# Patient Record
Sex: MALE | Race: WHITE | Employment: UNEMPLOYED | URBAN - METROPOLITAN AREA
[De-identification: names, ages, dates, MRNs, and addresses within clinical notes are randomized per-mention and may not be internally consistent; named-entity substitution may affect disease eponyms.]

---
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
## 2025-07-26 ENCOUNTER — APPOINTMENT (OUTPATIENT)
Dept: GENERAL RADIOLOGY | Facility: CLINIC | Age: 20
End: 2025-07-26
Attending: EMERGENCY MEDICINE
Payer: COMMERCIAL

## 2025-07-26 ENCOUNTER — HOSPITAL ENCOUNTER (EMERGENCY)
Facility: CLINIC | Age: 20
Discharge: HOME | End: 2025-07-26
Attending: EMERGENCY MEDICINE | Admitting: EMERGENCY MEDICINE
Payer: COMMERCIAL

## 2025-07-26 VITALS
RESPIRATION RATE: 18 BRPM | BODY MASS INDEX: 28.79 KG/M2 | TEMPERATURE: 97.4 F | SYSTOLIC BLOOD PRESSURE: 130 MMHG | OXYGEN SATURATION: 100 % | WEIGHT: 190 LBS | HEART RATE: 98 BPM | DIASTOLIC BLOOD PRESSURE: 88 MMHG | HEIGHT: 68 IN

## 2025-07-26 DIAGNOSIS — M54.41 ACUTE BILATERAL LOW BACK PAIN WITH RIGHT-SIDED SCIATICA: Primary | ICD-10-CM

## 2025-07-26 PROCEDURE — 72100 X-RAY EXAM L-S SPINE 2/3 VWS: CPT

## 2025-07-26 PROCEDURE — 99284 EMERGENCY DEPT VISIT MOD MDM: CPT | Performed by: EMERGENCY MEDICINE

## 2025-07-26 RX ORDER — HYDROCODONE BITARTRATE AND ACETAMINOPHEN 5; 325 MG/1; MG/1
1 TABLET ORAL EVERY 6 HOURS PRN
Qty: 10 TABLET | Refills: 0 | Status: SHIPPED | OUTPATIENT
Start: 2025-07-26 | End: 2025-07-29

## 2025-07-26 RX ORDER — CYCLOBENZAPRINE HCL 10 MG
5 TABLET ORAL 3 TIMES DAILY PRN
Qty: 11 TABLET | Refills: 0 | Status: SHIPPED | OUTPATIENT
Start: 2025-07-26 | End: 2025-08-02

## 2025-07-26 RX ORDER — METHYLPREDNISOLONE 4 MG/1
TABLET ORAL
Qty: 21 TABLET | Refills: 0 | Status: SHIPPED | OUTPATIENT
Start: 2025-07-26

## 2025-07-26 ASSESSMENT — COLUMBIA-SUICIDE SEVERITY RATING SCALE - C-SSRS
2. HAVE YOU ACTUALLY HAD ANY THOUGHTS OF KILLING YOURSELF IN THE PAST MONTH?: NO
1. IN THE PAST MONTH, HAVE YOU WISHED YOU WERE DEAD OR WISHED YOU COULD GO TO SLEEP AND NOT WAKE UP?: NO
6. HAVE YOU EVER DONE ANYTHING, STARTED TO DO ANYTHING, OR PREPARED TO DO ANYTHING TO END YOUR LIFE?: NO

## 2025-07-26 ASSESSMENT — ACTIVITIES OF DAILY LIVING (ADL): ADLS_ACUITY_SCORE: 41

## 2025-07-26 NOTE — LETTER
July 26, 2025      To Whom It May Concern:      Hiren Allen was seen in our Emergency Department today, 07/26/25.  I expect his condition to improve over the next 5 days.  He may return to work/school when improved.    Sincerely,        Sung Alexander MD  Electronically signed

## 2025-07-26 NOTE — ED PROVIDER NOTES
"GERSON Maradiaga presents to the Emergency Department with right-sided low back pain with radiating leg symptoms. Pain initially started Thursday while playing ball but significantly worsened Friday while playing tag at the park. Patient was running around the park when he developed severe pain that forced him to stop activity. Pain originates in the right lower back and radiates down the entire right leg along the outside/lateral aspect all the way to the foot. Associated with \"numbness\" in the right leg. Pain is constant since onset and has been present for 2 days. Patient reports significant difficulty getting up from sitting position, describing this as the worst position. Also experiences pain when sitting back down. Able to walk but with discomfort. Pain is worse with right leg elevation. Left leg is unaffected. Denies bowel or bladder incontinence. Denies fever, vomiting, or other systemic symptoms. No specific injury or heavy lifting precipitated current episode.      MEDICATIONS/ALLEVIATING FACTORS    Patient tried ibuprofen for pain relief. Reports that ibuprofen helps somewhat but effect is temporary, lasting approximately 1 hour before pain returns to baseline severity.      ROS: 10 point review of systems performed and is otherwise negative    Right-sided low back pain radiating to right leg with associated numbness. Difficulty with position changes, particularly getting up from sitting. Pain with right leg elevation. Denies bowel/bladder incontinence, fever, or vomiting.      EXAM     -Vitals reviewed, normal   - GEN: NAD, able to ambulate  - Back: Right-sided lower back mild tenderness to palpation, more pronounced on right side  - Extremities: Right leg - pain with straight leg raise to 45 deg, sensation intact to light touch. Left leg - normal range of motion, no pain with elevation  - Neuro: Alert and oriented, sensation intact      PERTINENT PAST MEDICAL HISTORY     History of similar back pain " episode approximately 2 months ago. At that time, primary care provider evaluated and felt it was likely muscle strain. Patient received chiropractic adjustment which resolved symptoms completely until current episode. No other significant past medical history reported.      DIAGNOSTICS     Lumbar spine XR, 2-3 views   Preliminary Result   IMPRESSION: Five lumbar-type vertebrae. Normal alignment. Vertebral body heights normal. No fractures. No significant degenerative change.              PROCEDURES  none      MDM    16 yr old male presents with acute exacerbation of right-sided low back pain with radicular symptoms. Pain radiates down entire right leg along lateral distribution with associated subjective numbness, consistent with nerve root irritation. History of similar episode 2 months prior that resolved with conservative treatment. Current episode triggered by physical activity but no heavy lifting. Differential diagnosis includes lumbar disc herniation with nerve root compression, lumbar radiculopathy, or lumbar strain with nerve irritation. Workup includes lumbar spine x-rays to evaluate for structural abnormalities. MRI would be ideal for soft tissue evaluation but not available emergently.      A/P    1. Right-sided low back pain with radiculopathy     - Lumbar spine x-rays      - Pain medication prescribed - norco     - Muscle relaxer prescribed - flexeril     - steroid prescribed - medrol dose pack     - Referral to spine specialist for outpatient follow-up     - Patient counseled that MRI may be needed as outpatient if symptoms persist     - Return precautions discussed      DISPOSITION  Discharge home   The diagnosis, treatment options, risks and follow-up discussed and all questions answered.        Sung Alexander MD  07/26/25 [id number]

## 2025-07-26 NOTE — DISCHARGE INSTRUCTIONS
Please return to the ER if new or worsening symptoms for re-evaluation. I hope you get better quickly.   Your xray looked good.   Follow up with neurosurgery  Take medications as prescribed  No driving if taking pain pills, muscle relaxers.  No heavy lifting until resolved.   It was a pleasure to meet you.

## 2025-07-29 ENCOUNTER — PATIENT OUTREACH (OUTPATIENT)
Dept: CARE COORDINATION | Facility: CLINIC | Age: 20
End: 2025-07-29
Payer: COMMERCIAL

## 2025-07-31 ENCOUNTER — PATIENT OUTREACH (OUTPATIENT)
Dept: CARE COORDINATION | Facility: CLINIC | Age: 20
End: 2025-07-31
Payer: COMMERCIAL

## 2025-08-25 ENCOUNTER — TRANSCRIBE ORDERS (OUTPATIENT)
Dept: OTHER | Age: 20
End: 2025-08-25

## 2025-08-25 DIAGNOSIS — M43.06 LUMBAR SPONDYLOLYSIS: Primary | ICD-10-CM

## 2025-08-27 ENCOUNTER — THERAPY VISIT (OUTPATIENT)
Dept: PHYSICAL THERAPY | Facility: CLINIC | Age: 20
End: 2025-08-27
Attending: PHYSICIAN ASSISTANT
Payer: COMMERCIAL

## 2025-08-27 DIAGNOSIS — M43.06 LUMBAR SPONDYLOLYSIS: Primary | ICD-10-CM

## 2025-08-27 PROCEDURE — 97110 THERAPEUTIC EXERCISES: CPT | Mod: GP | Performed by: PHYSICAL THERAPIST

## 2025-08-27 PROCEDURE — 97161 PT EVAL LOW COMPLEX 20 MIN: CPT | Mod: GP | Performed by: PHYSICAL THERAPIST
